# Patient Record
Sex: MALE | Race: BLACK OR AFRICAN AMERICAN | Employment: UNEMPLOYED | URBAN - METROPOLITAN AREA
[De-identification: names, ages, dates, MRNs, and addresses within clinical notes are randomized per-mention and may not be internally consistent; named-entity substitution may affect disease eponyms.]

---

## 2018-05-02 PROBLEM — R10.9 ABDOMINAL PAIN: Status: ACTIVE | Noted: 2018-05-02

## 2023-09-06 ENCOUNTER — APPOINTMENT (OUTPATIENT)
Dept: GENERAL RADIOLOGY | Age: 38
End: 2023-09-06

## 2023-09-06 ENCOUNTER — HOSPITAL ENCOUNTER (EMERGENCY)
Age: 38
Discharge: HOME | End: 2023-09-06
Attending: EMERGENCY MEDICINE

## 2023-09-06 VITALS
SYSTOLIC BLOOD PRESSURE: 137 MMHG | BODY MASS INDEX: 18.27 KG/M2 | HEIGHT: 74 IN | TEMPERATURE: 98.5 F | OXYGEN SATURATION: 100 % | WEIGHT: 142.4 LBS | RESPIRATION RATE: 18 BRPM | HEART RATE: 93 BPM | DIASTOLIC BLOOD PRESSURE: 98 MMHG

## 2023-09-06 DIAGNOSIS — F10.930 ALCOHOL WITHDRAWAL SYNDROME WITHOUT COMPLICATION (HCC): Primary | ICD-10-CM

## 2023-09-06 LAB
ALBUMIN SERPL-MCNC: 4.5 G/DL (ref 3.4–5)
ALBUMIN/GLOB SERPL: 1.1 {RATIO} (ref 1.1–2.2)
ALP SERPL-CCNC: 135 U/L (ref 40–129)
ALT SERPL-CCNC: 125 U/L (ref 10–40)
ANION GAP SERPL CALCULATED.3IONS-SCNC: 19 MMOL/L (ref 3–16)
AST SERPL-CCNC: 497 U/L (ref 15–37)
BASOPHILS # BLD: 0 K/UL (ref 0–0.2)
BASOPHILS NFR BLD: 0.6 %
BILIRUB SERPL-MCNC: 1.4 MG/DL (ref 0–1)
BUN SERPL-MCNC: 6 MG/DL (ref 7–20)
CALCIUM SERPL-MCNC: 8.7 MG/DL (ref 8.3–10.6)
CHLORIDE SERPL-SCNC: 95 MMOL/L (ref 99–110)
CO2 SERPL-SCNC: 23 MMOL/L (ref 21–32)
CREAT SERPL-MCNC: 0.5 MG/DL (ref 0.9–1.3)
DEPRECATED RDW RBC AUTO: 15.5 % (ref 12.4–15.4)
EKG ATRIAL RATE: 84 BPM
EKG DIAGNOSIS: NORMAL
EKG P AXIS: 52 DEGREES
EKG P-R INTERVAL: 146 MS
EKG Q-T INTERVAL: 438 MS
EKG QRS DURATION: 84 MS
EKG QTC CALCULATION (BAZETT): 517 MS
EKG R AXIS: -14 DEGREES
EKG T AXIS: 8 DEGREES
EKG VENTRICULAR RATE: 84 BPM
EOSINOPHIL # BLD: 0 K/UL (ref 0–0.6)
EOSINOPHIL NFR BLD: 0 %
GFR SERPLBLD CREATININE-BSD FMLA CKD-EPI: >60 ML/MIN/{1.73_M2}
GLUCOSE SERPL-MCNC: 74 MG/DL (ref 70–99)
HCT VFR BLD AUTO: 32.9 % (ref 40.5–52.5)
HGB BLD-MCNC: 11.4 G/DL (ref 13.5–17.5)
LACTATE BLDV-SCNC: 1.3 MMOL/L (ref 0.4–1.9)
LIPASE SERPL-CCNC: 50 U/L (ref 13–60)
LYMPHOCYTES # BLD: 1.4 K/UL (ref 1–5.1)
LYMPHOCYTES NFR BLD: 30 %
MCH RBC QN AUTO: 37.6 PG (ref 26–34)
MCHC RBC AUTO-ENTMCNC: 34.6 G/DL (ref 31–36)
MCV RBC AUTO: 108.6 FL (ref 80–100)
MONOCYTES # BLD: 0.2 K/UL (ref 0–1.3)
MONOCYTES NFR BLD: 5.3 %
NEUTROPHILS # BLD: 3 K/UL (ref 1.7–7.7)
NEUTROPHILS NFR BLD: 64.1 %
PLATELET # BLD AUTO: 53 K/UL (ref 135–450)
PMV BLD AUTO: 9.1 FL (ref 5–10.5)
POTASSIUM SERPL-SCNC: 3.6 MMOL/L (ref 3.5–5.1)
PROT SERPL-MCNC: 8.5 G/DL (ref 6.4–8.2)
RBC # BLD AUTO: 3.03 M/UL (ref 4.2–5.9)
SODIUM SERPL-SCNC: 137 MMOL/L (ref 136–145)
WBC # BLD AUTO: 4.6 K/UL (ref 4–11)

## 2023-09-06 PROCEDURE — 83605 ASSAY OF LACTIC ACID: CPT

## 2023-09-06 PROCEDURE — 80053 COMPREHEN METABOLIC PANEL: CPT

## 2023-09-06 PROCEDURE — 36415 COLL VENOUS BLD VENIPUNCTURE: CPT

## 2023-09-06 PROCEDURE — 2580000003 HC RX 258: Performed by: EMERGENCY MEDICINE

## 2023-09-06 PROCEDURE — 93005 ELECTROCARDIOGRAM TRACING: CPT | Performed by: EMERGENCY MEDICINE

## 2023-09-06 PROCEDURE — 85025 COMPLETE CBC W/AUTO DIFF WBC: CPT

## 2023-09-06 PROCEDURE — 6370000000 HC RX 637 (ALT 250 FOR IP): Performed by: EMERGENCY MEDICINE

## 2023-09-06 PROCEDURE — 83690 ASSAY OF LIPASE: CPT

## 2023-09-06 PROCEDURE — 71046 X-RAY EXAM CHEST 2 VIEWS: CPT

## 2023-09-06 PROCEDURE — 99285 EMERGENCY DEPT VISIT HI MDM: CPT

## 2023-09-06 RX ORDER — LORAZEPAM 2 MG/ML
2 CONCENTRATE ORAL
Status: DISCONTINUED | OUTPATIENT
Start: 2023-09-06 | End: 2023-09-06 | Stop reason: HOSPADM

## 2023-09-06 RX ORDER — LORAZEPAM 2 MG/ML
1 CONCENTRATE ORAL
Status: DISCONTINUED | OUTPATIENT
Start: 2023-09-06 | End: 2023-09-06 | Stop reason: HOSPADM

## 2023-09-06 RX ORDER — LORAZEPAM 1 MG/1
2 TABLET ORAL
Status: DISCONTINUED | OUTPATIENT
Start: 2023-09-06 | End: 2023-09-06 | Stop reason: HOSPADM

## 2023-09-06 RX ORDER — LORAZEPAM 1 MG/1
4 TABLET ORAL
Status: DISCONTINUED | OUTPATIENT
Start: 2023-09-06 | End: 2023-09-06 | Stop reason: HOSPADM

## 2023-09-06 RX ORDER — 0.9 % SODIUM CHLORIDE 0.9 %
1000 INTRAVENOUS SOLUTION INTRAVENOUS ONCE
Status: COMPLETED | OUTPATIENT
Start: 2023-09-06 | End: 2023-09-06

## 2023-09-06 RX ORDER — LORAZEPAM 1 MG/1
3 TABLET ORAL
Status: DISCONTINUED | OUTPATIENT
Start: 2023-09-06 | End: 2023-09-06 | Stop reason: HOSPADM

## 2023-09-06 RX ORDER — LORAZEPAM 1 MG/1
1 TABLET ORAL
Status: DISCONTINUED | OUTPATIENT
Start: 2023-09-06 | End: 2023-09-06 | Stop reason: HOSPADM

## 2023-09-06 RX ORDER — LORAZEPAM 2 MG/ML
3 CONCENTRATE ORAL
Status: DISCONTINUED | OUTPATIENT
Start: 2023-09-06 | End: 2023-09-06 | Stop reason: HOSPADM

## 2023-09-06 RX ORDER — LIDOCAINE 4 G/G
1 PATCH TOPICAL DAILY
Status: DISCONTINUED | OUTPATIENT
Start: 2023-09-06 | End: 2023-09-06 | Stop reason: HOSPADM

## 2023-09-06 RX ORDER — LORAZEPAM 2 MG/ML
4 CONCENTRATE ORAL
Status: DISCONTINUED | OUTPATIENT
Start: 2023-09-06 | End: 2023-09-06 | Stop reason: HOSPADM

## 2023-09-06 RX ADMIN — SODIUM CHLORIDE 1000 ML: 9 INJECTION, SOLUTION INTRAVENOUS at 12:45

## 2023-09-06 RX ADMIN — LORAZEPAM 1 MG: 1 TABLET ORAL at 13:18

## 2023-09-06 ASSESSMENT — PAIN DESCRIPTION - PAIN TYPE: TYPE: ACUTE PAIN

## 2023-09-06 ASSESSMENT — PAIN - FUNCTIONAL ASSESSMENT: PAIN_FUNCTIONAL_ASSESSMENT: 0-10

## 2023-09-06 ASSESSMENT — PAIN DESCRIPTION - ORIENTATION: ORIENTATION: LEFT

## 2023-09-06 ASSESSMENT — PAIN DESCRIPTION - LOCATION: LOCATION: RIB CAGE

## 2023-09-06 ASSESSMENT — PAIN DESCRIPTION - DESCRIPTORS: DESCRIPTORS: ACHING

## 2023-09-06 ASSESSMENT — PAIN SCALES - GENERAL: PAINLEVEL_OUTOF10: 9

## 2023-09-06 ASSESSMENT — PAIN DESCRIPTION - FREQUENCY: FREQUENCY: CONTINUOUS

## 2023-09-06 NOTE — CARE COORDINATION
3:53 PM  KAILA spoke to teresita kirkpatrick pt is able to go to [facility] if he can make it there in an hour. Teresita kirkpatrick is setting up rehab admission. KAILA spoke to RN she is DC pt now. KAILA scheduled lyft, they will be here to  pt in 5mins, RN is aware. Pt is aware he needs to call [phone] on they [address] to complete his intake for rehab. Electronically signed by WU Dubois LSW on 9/6/2023 at 3:53 PM    3:29 PM    Teresita is reaching out to pt via phone since she is unable to make it to the hospital at the moment. Teresita has been working on finding a rehab for pt to go to. Electronically signed by WU Dubois LSW on 9/6/2023 at 3:29 PM    12:50 PM    KAILA cons notes. Pt is interested in treatment. KAILA spoke to teresita kirkpatrick at [facility], she will come discuss detox and treatment options w/pt. SW following.   Electronically signed by WU Dubois LSW on 9/6/2023 at 12:50 PM  [phone]

## 2023-09-06 NOTE — DISCHARGE INSTRUCTIONS
Please go directly to the alcohol withdrawal treatment center. Please return to emergency department if you are unable to be treated and continue tohave withdrawal symptoms.

## 2023-09-06 NOTE — ED PROVIDER NOTES
[facility]          ATTENDING PHYSICIAN NOTE       Date of evaluation: 9/6/2023      Assessment/ Medical Decion Making     MDM:       ED Course as of 09/07/23 1029   Wed Sep 06, 2023   156 70-year-old male with history of alcohol use disorder with withdrawal who presents for evaluation of rib pain and symptoms of alcohol withdrawal.  Clinically in alcohol withdrawal on exam.  Will monitor CIWA's and treat symptomatically. Will check labs given his history of alcoholic hepatitis as well as his vomiting. Will obtain chest x-ray to evaluate for rib fracture or associated complication. Since he is normoxic on room air, do not feel that he needs CT unless chest x-ray is markedly abnormal.  Will be receiving benzodiazepines for alcohol withdrawal and lidocaine patch applied for symptoms. EKG obtained and interpreted by me with sinus rhythm, normal axis, prolonged QT, large T waves that do not appear ischemic. Isolated T wave inversion in lead III but otherwise no ischemia. No prior for comparison. [MM]   1358 Update: Labs are stable to improved from his baseline. X-ray shows rib fracture that is nondisplaced without evidence of pneumothorax or effusion. [MM]   1400 Patient medicated per CIWA. [MM]      ED Course User Index  [MM] Trinidad Tovar MD     Update: There are social work consultation patient was able to get a bed for inpatient treatment alcohol use disorder and alcohol withdrawal syndrome. He was stable for discharge to be transported directly to treatment facility. Counseled to return to the ED if he was unable to obtain treatment for any reason and continued to have withdrawal symptoms. Medical Decision Making  Amount and/or Complexity of Data Reviewed  Labs: ordered. Radiology: ordered. ECG/medicine tests: ordered. Risk  OTC drugs. Prescription drug management. Ghazal Worthy MD  10:29 AM    Clinical Impression     1.  Alcohol withdrawal

## 2024-03-11 ENCOUNTER — APPOINTMENT (OUTPATIENT)
Dept: CT IMAGING | Age: 39
End: 2024-03-11
Payer: MEDICAID

## 2024-03-11 ENCOUNTER — APPOINTMENT (OUTPATIENT)
Dept: GENERAL RADIOLOGY | Age: 39
End: 2024-03-11
Payer: MEDICAID

## 2024-03-11 ENCOUNTER — HOSPITAL ENCOUNTER (INPATIENT)
Age: 39
LOS: 4 days | Discharge: HOME | End: 2024-03-15
Attending: STUDENT IN AN ORGANIZED HEALTH CARE EDUCATION/TRAINING PROGRAM | Admitting: INTERNAL MEDICINE
Payer: MEDICAID

## 2024-03-11 DIAGNOSIS — F10.939 ALCOHOL WITHDRAWAL SYNDROME WITH COMPLICATION (HCC): Primary | ICD-10-CM

## 2024-03-11 DIAGNOSIS — E87.5 HYPERKALEMIA: ICD-10-CM

## 2024-03-11 DIAGNOSIS — E87.20 LACTIC ACIDOSIS: ICD-10-CM

## 2024-03-11 PROBLEM — F10.931 ALCOHOL WITHDRAWAL DELIRIUM (HCC): Status: ACTIVE | Noted: 2024-03-11

## 2024-03-11 LAB
ALBUMIN SERPL-MCNC: 5.3 G/DL (ref 3.4–5)
ALP SERPL-CCNC: 200 U/L (ref 40–129)
ALT SERPL-CCNC: 244 U/L (ref 10–40)
ANION GAP SERPL CALCULATED.3IONS-SCNC: 39 MMOL/L (ref 3–16)
ANISOCYTOSIS BLD QL SMEAR: ABNORMAL
AST SERPL-CCNC: 1134 U/L (ref 15–37)
BACTERIA URNS QL MICRO: ABNORMAL /HPF
BASE EXCESS BLDV CALC-SCNC: -14.9 MMOL/L (ref -2–3)
BASOPHILS # BLD: 0 K/UL (ref 0–0.2)
BASOPHILS NFR BLD: 0 %
BILIRUB DIRECT SERPL-MCNC: 0.9 MG/DL (ref 0–0.3)
BILIRUB INDIRECT SERPL-MCNC: 0.6 MG/DL (ref 0–1)
BILIRUB SERPL-MCNC: 1.5 MG/DL (ref 0–1)
BILIRUB UR QL STRIP.AUTO: NEGATIVE
BUN SERPL-MCNC: 8 MG/DL (ref 7–20)
CALCIUM SERPL-MCNC: 9.2 MG/DL (ref 8.3–10.6)
CHLORIDE SERPL-SCNC: 94 MMOL/L (ref 99–110)
CK SERPL-CCNC: 464 U/L (ref 39–308)
CLARITY UR: CLEAR
CO2 BLDV-SCNC: 14 MMOL/L
CO2 SERPL-SCNC: 8 MMOL/L (ref 21–32)
COHGB MFR BLDV: 1.5 % (ref 0–1.5)
COLOR UR: YELLOW
CREAT SERPL-MCNC: 0.9 MG/DL (ref 0.9–1.3)
DEPRECATED RDW RBC AUTO: 15.4 % (ref 12.4–15.4)
DO-HGB MFR BLDV: 50.1 %
EOSINOPHIL # BLD: 0 K/UL (ref 0–0.6)
EOSINOPHIL NFR BLD: 0 %
EPI CELLS #/AREA URNS HPF: ABNORMAL /HPF (ref 0–5)
ETHANOLAMINE SERPL-MCNC: 35 MG/DL (ref 0–0.08)
GFR SERPLBLD CREATININE-BSD FMLA CKD-EPI: >60 ML/MIN/{1.73_M2}
GLUCOSE BLD-MCNC: 118 MG/DL (ref 70–99)
GLUCOSE BLD-MCNC: 120 MG/DL (ref 70–99)
GLUCOSE BLD-MCNC: 149 MG/DL (ref 70–99)
GLUCOSE SERPL-MCNC: 97 MG/DL (ref 70–99)
GLUCOSE UR STRIP.AUTO-MCNC: NEGATIVE MG/DL
HCO3 BLDV-SCNC: 12.8 MMOL/L (ref 24–28)
HCT VFR BLD AUTO: 42.5 % (ref 40.5–52.5)
HGB BLD-MCNC: 14 G/DL (ref 13.5–17.5)
HGB UR QL STRIP.AUTO: ABNORMAL
HYALINE CASTS #/AREA URNS LPF: ABNORMAL /LPF (ref 0–2)
KETONES UR STRIP.AUTO-MCNC: >=80 MG/DL
LACTATE BLDV-SCNC: 5 MMOL/L (ref 0.4–1.9)
LACTATE BLDV-SCNC: 9.1 MMOL/L (ref 0.4–1.9)
LEUKOCYTE ESTERASE UR QL STRIP.AUTO: NEGATIVE
LIPASE SERPL-CCNC: 41 U/L (ref 13–60)
LIPASE SERPL-CCNC: 55 U/L (ref 13–60)
LYMPHOCYTES # BLD: 0.7 K/UL (ref 1–5.1)
LYMPHOCYTES NFR BLD: 8 %
MACROCYTES BLD QL SMEAR: ABNORMAL
MCH RBC QN AUTO: 38.2 PG (ref 26–34)
MCHC RBC AUTO-ENTMCNC: 32.8 G/DL (ref 31–36)
MCV RBC AUTO: 116.5 FL (ref 80–100)
METHGB MFR BLDV: 0.5 % (ref 0–1.5)
MONOCYTES # BLD: 0.3 K/UL (ref 0–1.3)
MONOCYTES NFR BLD: 3 %
NEUTROPHILS # BLD: 7.5 K/UL (ref 1.7–7.7)
NEUTROPHILS NFR BLD: 78 %
NEUTS BAND NFR BLD MANUAL: 11 % (ref 0–7)
NITRITE UR QL STRIP.AUTO: NEGATIVE
NRBC BLD-RTO: 4 /100 WBC
NT-PROBNP SERPL-MCNC: 107 PG/ML (ref 0–124)
PCO2 BLDV: 35.5 MMHG (ref 41–51)
PERFORMED ON: ABNORMAL
PH BLDV: 7.17 [PH] (ref 7.35–7.45)
PH UR STRIP.AUTO: 6 [PH] (ref 5–8)
PHOSPHATE SERPL-MCNC: 3.4 MG/DL (ref 2.5–4.9)
PLATELET # BLD AUTO: 55 K/UL (ref 135–450)
PLATELET BLD QL SMEAR: ABNORMAL
PMV BLD AUTO: 10.5 FL (ref 5–10.5)
PO2 BLDV: <30 MMHG (ref 25–40)
POTASSIUM SERPL-SCNC: 5.8 MMOL/L (ref 3.5–5.1)
PROT SERPL-MCNC: 10.7 G/DL (ref 6.4–8.2)
PROT UR STRIP.AUTO-MCNC: 100 MG/DL
RBC # BLD AUTO: 3.65 M/UL (ref 4.2–5.9)
RBC #/AREA URNS HPF: ABNORMAL /HPF (ref 0–4)
SAO2 % BLDV: 49 %
SODIUM SERPL-SCNC: 141 MMOL/L (ref 136–145)
SP GR UR STRIP.AUTO: >=1.03 (ref 1–1.03)
TROPONIN, HIGH SENSITIVITY: <6 NG/L (ref 0–22)
TROPONIN, HIGH SENSITIVITY: <6 NG/L (ref 0–22)
UA COMPLETE W REFLEX CULTURE PNL UR: ABNORMAL
UA DIPSTICK W REFLEX MICRO PNL UR: YES
URN SPEC COLLECT METH UR: ABNORMAL
UROBILINOGEN UR STRIP-ACNC: 0.2 E.U./DL
WBC # BLD AUTO: 8.4 K/UL (ref 4–11)
WBC #/AREA URNS HPF: ABNORMAL /HPF (ref 0–5)

## 2024-03-11 PROCEDURE — 71046 X-RAY EXAM CHEST 2 VIEWS: CPT

## 2024-03-11 PROCEDURE — C9113 INJ PANTOPRAZOLE SODIUM, VIA: HCPCS | Performed by: STUDENT IN AN ORGANIZED HEALTH CARE EDUCATION/TRAINING PROGRAM

## 2024-03-11 PROCEDURE — 83690 ASSAY OF LIPASE: CPT

## 2024-03-11 PROCEDURE — 96361 HYDRATE IV INFUSION ADD-ON: CPT

## 2024-03-11 PROCEDURE — 99285 EMERGENCY DEPT VISIT HI MDM: CPT

## 2024-03-11 PROCEDURE — 84100 ASSAY OF PHOSPHORUS: CPT

## 2024-03-11 PROCEDURE — 82803 BLOOD GASES ANY COMBINATION: CPT

## 2024-03-11 PROCEDURE — 73562 X-RAY EXAM OF KNEE 3: CPT

## 2024-03-11 PROCEDURE — 84484 ASSAY OF TROPONIN QUANT: CPT

## 2024-03-11 PROCEDURE — 2580000003 HC RX 258

## 2024-03-11 PROCEDURE — 2060000000 HC ICU INTERMEDIATE R&B

## 2024-03-11 PROCEDURE — 80076 HEPATIC FUNCTION PANEL: CPT

## 2024-03-11 PROCEDURE — 6360000002 HC RX W HCPCS: Performed by: STUDENT IN AN ORGANIZED HEALTH CARE EDUCATION/TRAINING PROGRAM

## 2024-03-11 PROCEDURE — 81001 URINALYSIS AUTO W/SCOPE: CPT

## 2024-03-11 PROCEDURE — 80048 BASIC METABOLIC PNL TOTAL CA: CPT

## 2024-03-11 PROCEDURE — 93005 ELECTROCARDIOGRAM TRACING: CPT

## 2024-03-11 PROCEDURE — 85025 COMPLETE CBC W/AUTO DIFF WBC: CPT

## 2024-03-11 PROCEDURE — 82077 ASSAY SPEC XCP UR&BREATH IA: CPT

## 2024-03-11 PROCEDURE — 2580000003 HC RX 258: Performed by: INTERNAL MEDICINE

## 2024-03-11 PROCEDURE — 2500000003 HC RX 250 WO HCPCS: Performed by: STUDENT IN AN ORGANIZED HEALTH CARE EDUCATION/TRAINING PROGRAM

## 2024-03-11 PROCEDURE — 36415 COLL VENOUS BLD VENIPUNCTURE: CPT

## 2024-03-11 PROCEDURE — 6360000002 HC RX W HCPCS

## 2024-03-11 PROCEDURE — 87040 BLOOD CULTURE FOR BACTERIA: CPT

## 2024-03-11 PROCEDURE — 6370000000 HC RX 637 (ALT 250 FOR IP): Performed by: STUDENT IN AN ORGANIZED HEALTH CARE EDUCATION/TRAINING PROGRAM

## 2024-03-11 PROCEDURE — 74174 CTA ABD&PLVS W/CONTRAST: CPT

## 2024-03-11 PROCEDURE — 83880 ASSAY OF NATRIURETIC PEPTIDE: CPT

## 2024-03-11 PROCEDURE — 2580000003 HC RX 258: Performed by: STUDENT IN AN ORGANIZED HEALTH CARE EDUCATION/TRAINING PROGRAM

## 2024-03-11 PROCEDURE — 82550 ASSAY OF CK (CPK): CPT

## 2024-03-11 PROCEDURE — 96366 THER/PROPH/DIAG IV INF ADDON: CPT

## 2024-03-11 PROCEDURE — 6360000004 HC RX CONTRAST MEDICATION

## 2024-03-11 PROCEDURE — 96365 THER/PROPH/DIAG IV INF INIT: CPT

## 2024-03-11 PROCEDURE — 83605 ASSAY OF LACTIC ACID: CPT

## 2024-03-11 PROCEDURE — 96375 TX/PRO/DX INJ NEW DRUG ADDON: CPT

## 2024-03-11 RX ORDER — PHENOBARBITAL SODIUM 65 MG/ML
65 INJECTION, SOLUTION INTRAMUSCULAR; INTRAVENOUS 2 TIMES DAILY
Status: COMPLETED | OUTPATIENT
Start: 2024-03-12 | End: 2024-03-13

## 2024-03-11 RX ORDER — THIAMINE HYDROCHLORIDE 100 MG/ML
100 INJECTION, SOLUTION INTRAMUSCULAR; INTRAVENOUS DAILY
Status: DISCONTINUED | OUTPATIENT
Start: 2024-03-11 | End: 2024-03-11

## 2024-03-11 RX ORDER — ONDANSETRON 2 MG/ML
4 INJECTION INTRAMUSCULAR; INTRAVENOUS ONCE
Status: COMPLETED | OUTPATIENT
Start: 2024-03-11 | End: 2024-03-11

## 2024-03-11 RX ORDER — SODIUM CHLORIDE 0.9 % (FLUSH) 0.9 %
5-40 SYRINGE (ML) INJECTION EVERY 12 HOURS SCHEDULED
Status: DISCONTINUED | OUTPATIENT
Start: 2024-03-11 | End: 2024-03-15 | Stop reason: HOSPADM

## 2024-03-11 RX ORDER — SODIUM CHLORIDE 0.9 % (FLUSH) 0.9 %
5-40 SYRINGE (ML) INJECTION PRN
Status: DISCONTINUED | OUTPATIENT
Start: 2024-03-11 | End: 2024-03-15 | Stop reason: HOSPADM

## 2024-03-11 RX ORDER — PHENOBARBITAL SODIUM 65 MG/ML
15 INJECTION, SOLUTION INTRAMUSCULAR; INTRAVENOUS ONCE
Status: DISCONTINUED | OUTPATIENT
Start: 2024-03-11 | End: 2024-03-11

## 2024-03-11 RX ORDER — SODIUM CHLORIDE 9 MG/ML
INJECTION, SOLUTION INTRAVENOUS PRN
Status: DISCONTINUED | OUTPATIENT
Start: 2024-03-11 | End: 2024-03-15 | Stop reason: HOSPADM

## 2024-03-11 RX ORDER — SODIUM CHLORIDE, SODIUM LACTATE, POTASSIUM CHLORIDE, AND CALCIUM CHLORIDE .6; .31; .03; .02 G/100ML; G/100ML; G/100ML; G/100ML
1000 INJECTION, SOLUTION INTRAVENOUS ONCE
Status: COMPLETED | OUTPATIENT
Start: 2024-03-11 | End: 2024-03-11

## 2024-03-11 RX ORDER — HYDROMORPHONE HYDROCHLORIDE 1 MG/ML
0.5 INJECTION, SOLUTION INTRAMUSCULAR; INTRAVENOUS; SUBCUTANEOUS
Status: COMPLETED | OUTPATIENT
Start: 2024-03-11 | End: 2024-03-11

## 2024-03-11 RX ORDER — CALCIUM GLUCONATE 94 MG/ML
1000 INJECTION, SOLUTION INTRAVENOUS ONCE
Status: COMPLETED | OUTPATIENT
Start: 2024-03-11 | End: 2024-03-11

## 2024-03-11 RX ORDER — PANTOPRAZOLE SODIUM 40 MG/10ML
40 INJECTION, POWDER, LYOPHILIZED, FOR SOLUTION INTRAVENOUS ONCE
Status: COMPLETED | OUTPATIENT
Start: 2024-03-11 | End: 2024-03-11

## 2024-03-11 RX ORDER — DEXTROSE MONOHYDRATE 100 MG/ML
INJECTION, SOLUTION INTRAVENOUS CONTINUOUS PRN
Status: DISCONTINUED | OUTPATIENT
Start: 2024-03-11 | End: 2024-03-15 | Stop reason: HOSPADM

## 2024-03-11 RX ORDER — DEXTROSE AND SODIUM CHLORIDE 5; .45 G/100ML; G/100ML
INJECTION, SOLUTION INTRAVENOUS CONTINUOUS
Status: DISCONTINUED | OUTPATIENT
Start: 2024-03-11 | End: 2024-03-12

## 2024-03-11 RX ORDER — DEXTROSE, SODIUM CHLORIDE, SODIUM LACTATE, POTASSIUM CHLORIDE, AND CALCIUM CHLORIDE 5; .6; .31; .03; .02 G/100ML; G/100ML; G/100ML; G/100ML; G/100ML
1000 INJECTION, SOLUTION INTRAVENOUS CONTINUOUS
Status: DISCONTINUED | OUTPATIENT
Start: 2024-03-11 | End: 2024-03-12

## 2024-03-11 RX ORDER — GLUCAGON 1 MG/ML
1 KIT INJECTION PRN
Status: DISCONTINUED | OUTPATIENT
Start: 2024-03-11 | End: 2024-03-15 | Stop reason: HOSPADM

## 2024-03-11 RX ORDER — PHENOBARBITAL SODIUM 130 MG/ML
130 INJECTION, SOLUTION INTRAMUSCULAR; INTRAVENOUS
Status: DISCONTINUED | OUTPATIENT
Start: 2024-03-11 | End: 2024-03-15 | Stop reason: HOSPADM

## 2024-03-11 RX ORDER — NICOTINE 21 MG/24HR
1 PATCH, TRANSDERMAL 24 HOURS TRANSDERMAL DAILY
Status: DISCONTINUED | OUTPATIENT
Start: 2024-03-12 | End: 2024-03-15 | Stop reason: HOSPADM

## 2024-03-11 RX ORDER — PHENOBARBITAL SODIUM 65 MG/ML
65 INJECTION, SOLUTION INTRAMUSCULAR; INTRAVENOUS
Status: DISCONTINUED | OUTPATIENT
Start: 2024-03-11 | End: 2024-03-11

## 2024-03-11 RX ADMIN — DEXTROSE MONOHYDRATE 250 ML: 100 INJECTION, SOLUTION INTRAVENOUS at 20:47

## 2024-03-11 RX ADMIN — SODIUM CHLORIDE, SODIUM LACTATE, POTASSIUM CHLORIDE, CALCIUM CHLORIDE AND DEXTROSE MONOHYDRATE 1000 ML: 5; 600; 310; 30; 20 INJECTION, SOLUTION INTRAVENOUS at 21:20

## 2024-03-11 RX ADMIN — SODIUM BICARBONATE 50 MEQ: 84 INJECTION, SOLUTION INTRAVENOUS at 20:25

## 2024-03-11 RX ADMIN — CALCIUM GLUCONATE 1000 MG: 98 INJECTION, SOLUTION INTRAVENOUS at 20:28

## 2024-03-11 RX ADMIN — THIAMINE HYDROCHLORIDE 100 MG: 100 INJECTION, SOLUTION INTRAMUSCULAR; INTRAVENOUS at 19:14

## 2024-03-11 RX ADMIN — HYDROMORPHONE HYDROCHLORIDE 0.5 MG: 1 INJECTION, SOLUTION INTRAMUSCULAR; INTRAVENOUS; SUBCUTANEOUS at 19:16

## 2024-03-11 RX ADMIN — IOPAMIDOL 75 ML: 755 INJECTION, SOLUTION INTRAVENOUS at 19:50

## 2024-03-11 RX ADMIN — PANTOPRAZOLE SODIUM 40 MG: 40 INJECTION, POWDER, FOR SOLUTION INTRAVENOUS at 20:37

## 2024-03-11 RX ADMIN — PHENOBARBITAL SODIUM 934.7 MG: 130 INJECTION INTRAMUSCULAR at 21:51

## 2024-03-11 RX ADMIN — INSULIN HUMAN 10 UNITS: 100 INJECTION, SOLUTION PARENTERAL at 21:16

## 2024-03-11 RX ADMIN — ONDANSETRON 4 MG: 2 INJECTION INTRAMUSCULAR; INTRAVENOUS at 19:18

## 2024-03-11 RX ADMIN — SODIUM CHLORIDE, POTASSIUM CHLORIDE, SODIUM LACTATE AND CALCIUM CHLORIDE 1000 ML: 600; 310; 30; 20 INJECTION, SOLUTION INTRAVENOUS at 19:17

## 2024-03-11 RX ADMIN — SODIUM CHLORIDE, PRESERVATIVE FREE 10 ML: 5 INJECTION INTRAVENOUS at 22:12

## 2024-03-11 RX ADMIN — DEXTROSE AND SODIUM CHLORIDE: 5; 450 INJECTION, SOLUTION INTRAVENOUS at 22:11

## 2024-03-11 ASSESSMENT — PAIN SCALES - GENERAL
PAINLEVEL_OUTOF10: 10
PAINLEVEL_OUTOF10: 10

## 2024-03-11 ASSESSMENT — LIFESTYLE VARIABLES
HOW MANY STANDARD DRINKS CONTAINING ALCOHOL DO YOU HAVE ON A TYPICAL DAY: 10 OR MORE
HOW OFTEN DO YOU HAVE A DRINK CONTAINING ALCOHOL: 4 OR MORE TIMES A WEEK
HOW OFTEN DO YOU HAVE A DRINK CONTAINING ALCOHOL: 4 OR MORE TIMES A WEEK
HOW MANY STANDARD DRINKS CONTAINING ALCOHOL DO YOU HAVE ON A TYPICAL DAY: 10 OR MORE

## 2024-03-11 ASSESSMENT — PAIN DESCRIPTION - DESCRIPTORS: DESCRIPTORS: ACHING

## 2024-03-11 ASSESSMENT — PAIN - FUNCTIONAL ASSESSMENT: PAIN_FUNCTIONAL_ASSESSMENT: 0-10

## 2024-03-11 ASSESSMENT — PAIN DESCRIPTION - LOCATION: LOCATION: ABDOMEN

## 2024-03-11 ASSESSMENT — PAIN DESCRIPTION - PAIN TYPE: TYPE: ACUTE PAIN

## 2024-03-11 ASSESSMENT — PAIN DESCRIPTION - FREQUENCY: FREQUENCY: CONTINUOUS

## 2024-03-11 NOTE — ED PROVIDER NOTES
[facility]  EMERGENCY DEPARTMENT ENCOUNTER          EM RESIDENT NOTE     Date of evaluation: 3/11/2024    ADDENDUM:      Care of this patient was assumed from Dr. Candelario.  The patient was seen for Abdominal Pain and Flank Pain (Left flank pain and abdominal pain and tenderness started last night)  .  The patient's initial evaluation and plan have been discussed with the prior provider who initially evaluated the patient.  Nursing Notes, Past Medical Hx, Past Surgical Hx, Social Hx, Allergies, and Family Hx were all reviewed.    MEDICAL DECISION MAKING / ASSESSMENT / PLAN     Fred Gonzales is a 38 y.o. male who presents for evaluation of abdominal pain and flank pain.  Patient has a history of alcohol use disorder with alcoholic hepatitis and withdrawal seizures as well as a history of hypertension and previous MI.  Reportedly has had 1 day of abdominal tenderness with significant guarding.  Endorses 3 days of poor p.o. intake although does endorse normal stooling.  Has also had some chest pain and shortness of breath that he states is exertional.  Has also had several falls and has pain over his left knee.  No fevers or chills.    He drinks 1 L of alcohol per day and his last drink was sometime yesterday.  He does have a history of previous alcohol withdrawal seizures but is unable to tell me the last time he had this.    At this time, follow-up.  - 3 view knee XR  - Cadiac work-up  - abdominal work-op  - CT abdomen pelvis.     On reevaluation, patient was tachycardic and appeared very uncomfortable.  He was extremely tremulous as well as very tender in his abdomen.  Given his tachycardia, hypertension, tremulousness and other symptoms, I do have high concern for alcohol withdrawal, particularly given his endorsement of significant alcohol intake previous history of alcohol withdrawal.    His workup here demonstrated moderate blood in the urine with no signs of microscopic hematuria, increasing to concern  for potential rhabdo.  CK was elevated to 464.  He has tenderness all over his body but does not have any focal firmness to increase my concern for potential compartment syndrome.  His LFTs are significantly deranged with a AST of 1134, ALT of 244 and elevated alk phos to 200.  T. bili is also elevated to 1.5 with a D bili elevation to 0.9.  His lactate was very elevated to 9.1 which is likely secondary to very poor p.o. intake and significantly deranged LFTs causing difficulty clearing his lactate.  He has no elevated leukocytosis or fever to increase my concern for potential infection so I did add on blood cultures but I am not going to treat him empirically with antibiotics.    His renal panel otherwise demonstrated hyperkalemia to 5.8.  His EKG demonstrated peaked T waves so I did treat him with calcium as well as insulin and dextrose.  His bicarb on his BMP was 8 so I will also treat him with sodium bicarb.  His anion gap is 39, likely secondary to an lactic acidosis.    Patient has received thiamine as well as 1 L of fluids.  He had previously received Dilaudid and Zofran prior to my coming on shift.  He has had frequent episodes of emesis with some darkly colored emesis so I am treating him with Protonix as well.  Regarding his alcohol withdrawal, I am going to treat him with phenobarbital, initially with a 15 mg/kg dosage.     Patient's CT scan demonstrated no signs of aortic dissection or other acute intra-abdominal abnormalities.  It did demonstrate hepatic steatosis.    Given the patient's alcohol withdrawal and current lab abnormalities, he is appropriate to be admitted to the ICU.      I have discussed this patient's case with the admitting service who will now assume care.. The patient will be monitored in the emergency department until which time they are moved to their final treatment location.    Is this patient to be included in the SEP-1 core measure? No Exclusion criteria - the patient is NOT to

## 2024-03-11 NOTE — ED PROVIDER NOTES
ED Attending Attestation Note     Date of evaluation: 3/11/2024    This patient was seen by the resident.  I have seen and examined the patient, agree with the workup, evaluation, management and diagnosis. The care plan has been discussed.  I have reviewed the ECG and concur with the resident's interpretation.  Briefly, this is a gentleman with past medical history of alcohol use disorder and previous alcohol withdrawal seizures who presents to the emergency department with nausea, vomiting and concern for flank and chest pain.  Incidentally, he has not had a drink in almost 24 hours and usually drinks at least a pint of vodka per day.  On my initial evaluation, patient is noted to be uncomfortable, tachycardic and mildly hypertensive.  After addressing some of his pain, it is noted that he is becoming increasingly tremulous and agitated and there is concern for acute alcohol withdrawal.  Patient initiated on phenobarbital loading with phenobarbital PRNs ordered in order to help facilitate management of his acute alcohol withdrawal.  Additionally, it is noted that he has evidence of profound metabolic acidosis most likely from a combination of alcoholic and starvation ketosis.  He additionally has evidence of elevated CK concerning for slight rhabdo as well as acute alcoholic hepatitis.  Patient given a significant volume IV fluids and will be admitted to the ICU.    CRITICAL CARE TIME    I have seen and examined this patient and provided 55 minutes of critical care time exclusive of separately billed procedures and treating other patients.  Critical care was necessary to treat or prevent imminent or life threatening deterioration of the following condition(s): Acute alcohol withdrawal, vital sign instability, metabolic acidosis, alcoholic hepatitis, lactic acidosis, starvation ketosis    Critical care time was spent personally by me on the following activities: Discussion with primary provider and consultants,  chart review, ordering and interpretation of laboratory and other diagnostic testing, reassessment after intervention    MD Willi Chávez Olivia, MD  03/13/24 3387     HLD (hyperlipidemia)

## 2024-03-11 NOTE — ED PROVIDER NOTES
provider           Medical Decision Making  Amount and/or Complexity of Data Reviewed  Labs: ordered.  Radiology: ordered.  ECG/medicine tests: ordered.    Risk  Prescription drug management.        The patient was given the following medications:  Orders Placed This Encounter   Medications    lactated ringers bolus 1,000 mL    HYDROmorphone HCl PF (DILAUDID) injection 0.5 mg    ondansetron (ZOFRAN) injection 4 mg    thiamine (B-1) injection 100 mg       CONSULTS:  None    Clinical Impression   No diagnosis found.  Disposition   PATIENT REFERRED TO:  No follow-up provider specified.  DISCHARGE MEDICATIONS:  New Prescriptions    No medications on file     DISPOSITION      Diagnostic Results and Other Data   RADIOLOGY:  XR CHEST (2 VW)   Final Result      No acute pulmonary disease.         XR KNEE LEFT (3 VIEWS)   Final Result   No acute osseous injury.         CTA CHEST ABDOMEN PELVIS W CONTRAST    (Results Pending)     LABS:   Results for orders placed or performed during the hospital encounter of 03/11/24   Urinalysis with Reflex to Culture    Specimen: Urine   Result Value Ref Range    Color, UA Yellow Straw/Yellow    Clarity, UA Clear Clear    Glucose, Ur Negative Negative mg/dL    Bilirubin Urine Negative Negative    Ketones, Urine >=80 (A) Negative mg/dL    Specific Gravity, UA >=1.030 1.005 - 1.030    Blood, Urine MODERATE (A) Negative    pH, UA 6.0 5.0 - 8.0    Protein,  (A) Negative mg/dL    Urobilinogen, Urine 0.2 <2.0 E.U./dL    Nitrite, Urine Negative Negative    Leukocyte Esterase, Urine Negative Negative    Microscopic Examination YES     Urine Type NotGiven     Urine Reflex to Culture Not Indicated    Microscopic Urinalysis   Result Value Ref Range    Hyaline Casts, UA 3-5 (A) 0 - 2 /LPF    WBC, UA None seen 0 - 5 /HPF    RBC, UA 0-2 0 - 4 /HPF    Epithelial Cells, UA 0-1 0 - 5 /HPF    Bacteria, UA 1+ (A) None Seen /HPF     EKG   See ED course above for interpretation  Interpreted in conjunction  test.  Extremities: Extremities warm and perfused. 2+ radial & DP pulses b/l. <2 sec capillary refill. No peripheral edema.  Skin:  Warm. Dry. No apparent rashes.  Neuro:  Alert. Moves all four extremities to command. No gross focal deficit.    -------------------------------  This note was generated in part utilizing Dragon dictation speech recognition software.  Occasionally, words are mistranscribed and despite editing, the text may contain inaccuracies due to incorrect word recognition       Ti Candelario MD  Resident  03/11/24 1925

## 2024-03-12 LAB
ALBUMIN SERPL-MCNC: 4.6 G/DL (ref 3.4–5)
ALBUMIN/GLOB SERPL: 1 {RATIO} (ref 1.1–2.2)
ALP SERPL-CCNC: 155 U/L (ref 40–129)
ALT SERPL-CCNC: 172 U/L (ref 10–40)
ANION GAP SERPL CALCULATED.3IONS-SCNC: 25 MMOL/L (ref 3–16)
ANION GAP SERPL CALCULATED.3IONS-SCNC: 31 MMOL/L (ref 3–16)
AST SERPL-CCNC: 553 U/L (ref 15–37)
BILIRUB SERPL-MCNC: 1.4 MG/DL (ref 0–1)
BUN SERPL-MCNC: 7 MG/DL (ref 7–20)
BUN SERPL-MCNC: 7 MG/DL (ref 7–20)
CALCIUM SERPL-MCNC: 9.3 MG/DL (ref 8.3–10.6)
CALCIUM SERPL-MCNC: 9.7 MG/DL (ref 8.3–10.6)
CHLORIDE SERPL-SCNC: 93 MMOL/L (ref 99–110)
CHLORIDE SERPL-SCNC: 96 MMOL/L (ref 99–110)
CO2 SERPL-SCNC: 12 MMOL/L (ref 21–32)
CO2 SERPL-SCNC: 14 MMOL/L (ref 21–32)
CREAT SERPL-MCNC: 0.8 MG/DL (ref 0.9–1.3)
CREAT SERPL-MCNC: 0.9 MG/DL (ref 0.9–1.3)
EKG ATRIAL RATE: 119 BPM
EKG DIAGNOSIS: NORMAL
EKG P AXIS: 51 DEGREES
EKG P-R INTERVAL: 130 MS
EKG Q-T INTERVAL: 378 MS
EKG QRS DURATION: 82 MS
EKG QTC CALCULATION (BAZETT): 531 MS
EKG R AXIS: -37 DEGREES
EKG T AXIS: 44 DEGREES
EKG VENTRICULAR RATE: 119 BPM
GFR SERPLBLD CREATININE-BSD FMLA CKD-EPI: >60 ML/MIN/{1.73_M2}
GFR SERPLBLD CREATININE-BSD FMLA CKD-EPI: >60 ML/MIN/{1.73_M2}
GLUCOSE BLD-MCNC: 137 MG/DL (ref 70–99)
GLUCOSE BLD-MCNC: 139 MG/DL (ref 70–99)
GLUCOSE BLD-MCNC: 151 MG/DL (ref 70–99)
GLUCOSE BLD-MCNC: 185 MG/DL (ref 70–99)
GLUCOSE SERPL-MCNC: 127 MG/DL (ref 70–99)
GLUCOSE SERPL-MCNC: 137 MG/DL (ref 70–99)
MAGNESIUM SERPL-MCNC: 1.6 MG/DL (ref 1.8–2.4)
PERFORMED ON: ABNORMAL
POTASSIUM SERPL-SCNC: 4.6 MMOL/L (ref 3.5–5.1)
POTASSIUM SERPL-SCNC: 5.1 MMOL/L (ref 3.5–5.1)
PROT SERPL-MCNC: 9.2 G/DL (ref 6.4–8.2)
SODIUM SERPL-SCNC: 132 MMOL/L (ref 136–145)
SODIUM SERPL-SCNC: 139 MMOL/L (ref 136–145)

## 2024-03-12 PROCEDURE — HZ2ZZZZ DETOXIFICATION SERVICES FOR SUBSTANCE ABUSE TREATMENT: ICD-10-PCS | Performed by: STUDENT IN AN ORGANIZED HEALTH CARE EDUCATION/TRAINING PROGRAM

## 2024-03-12 PROCEDURE — 6360000002 HC RX W HCPCS: Performed by: STUDENT IN AN ORGANIZED HEALTH CARE EDUCATION/TRAINING PROGRAM

## 2024-03-12 PROCEDURE — A4216 STERILE WATER/SALINE, 10 ML: HCPCS | Performed by: INTERNAL MEDICINE

## 2024-03-12 PROCEDURE — 36415 COLL VENOUS BLD VENIPUNCTURE: CPT

## 2024-03-12 PROCEDURE — C9113 INJ PANTOPRAZOLE SODIUM, VIA: HCPCS | Performed by: INTERNAL MEDICINE

## 2024-03-12 PROCEDURE — 83735 ASSAY OF MAGNESIUM: CPT

## 2024-03-12 PROCEDURE — 6360000002 HC RX W HCPCS: Performed by: INTERNAL MEDICINE

## 2024-03-12 PROCEDURE — 2500000003 HC RX 250 WO HCPCS: Performed by: INTERNAL MEDICINE

## 2024-03-12 PROCEDURE — 6370000000 HC RX 637 (ALT 250 FOR IP): Performed by: INTERNAL MEDICINE

## 2024-03-12 PROCEDURE — 2580000003 HC RX 258: Performed by: STUDENT IN AN ORGANIZED HEALTH CARE EDUCATION/TRAINING PROGRAM

## 2024-03-12 PROCEDURE — 2580000003 HC RX 258: Performed by: INTERNAL MEDICINE

## 2024-03-12 PROCEDURE — 2060000000 HC ICU INTERMEDIATE R&B

## 2024-03-12 PROCEDURE — 80053 COMPREHEN METABOLIC PANEL: CPT

## 2024-03-12 RX ORDER — SODIUM CHLORIDE, SODIUM LACTATE, POTASSIUM CHLORIDE, CALCIUM CHLORIDE 600; 310; 30; 20 MG/100ML; MG/100ML; MG/100ML; MG/100ML
INJECTION, SOLUTION INTRAVENOUS CONTINUOUS
Status: DISCONTINUED | OUTPATIENT
Start: 2024-03-12 | End: 2024-03-14

## 2024-03-12 RX ORDER — LORAZEPAM 2 MG/ML
1 INJECTION INTRAMUSCULAR
Status: DISCONTINUED | OUTPATIENT
Start: 2024-03-12 | End: 2024-03-15 | Stop reason: HOSPADM

## 2024-03-12 RX ORDER — LORAZEPAM 1 MG/1
1 TABLET ORAL
Status: DISCONTINUED | OUTPATIENT
Start: 2024-03-12 | End: 2024-03-15 | Stop reason: HOSPADM

## 2024-03-12 RX ORDER — LORAZEPAM 2 MG/ML
2 INJECTION INTRAMUSCULAR
Status: DISCONTINUED | OUTPATIENT
Start: 2024-03-12 | End: 2024-03-15 | Stop reason: HOSPADM

## 2024-03-12 RX ORDER — LORAZEPAM 2 MG/ML
4 INJECTION INTRAMUSCULAR
Status: DISCONTINUED | OUTPATIENT
Start: 2024-03-12 | End: 2024-03-15 | Stop reason: HOSPADM

## 2024-03-12 RX ORDER — PROCHLORPERAZINE EDISYLATE 5 MG/ML
5 INJECTION INTRAMUSCULAR; INTRAVENOUS ONCE
Status: COMPLETED | OUTPATIENT
Start: 2024-03-12 | End: 2024-03-12

## 2024-03-12 RX ORDER — LORAZEPAM 1 MG/1
2 TABLET ORAL
Status: DISCONTINUED | OUTPATIENT
Start: 2024-03-12 | End: 2024-03-15 | Stop reason: HOSPADM

## 2024-03-12 RX ORDER — LORAZEPAM 1 MG/1
4 TABLET ORAL
Status: DISCONTINUED | OUTPATIENT
Start: 2024-03-12 | End: 2024-03-15 | Stop reason: HOSPADM

## 2024-03-12 RX ORDER — ACETAMINOPHEN 325 MG/1
650 TABLET ORAL EVERY 8 HOURS PRN
Status: DISCONTINUED | OUTPATIENT
Start: 2024-03-12 | End: 2024-03-15 | Stop reason: HOSPADM

## 2024-03-12 RX ORDER — LORAZEPAM 2 MG/ML
3 INJECTION INTRAMUSCULAR
Status: DISCONTINUED | OUTPATIENT
Start: 2024-03-12 | End: 2024-03-15 | Stop reason: HOSPADM

## 2024-03-12 RX ORDER — LORAZEPAM 1 MG/1
3 TABLET ORAL
Status: DISCONTINUED | OUTPATIENT
Start: 2024-03-12 | End: 2024-03-15 | Stop reason: HOSPADM

## 2024-03-12 RX ORDER — MAGNESIUM SULFATE IN WATER 40 MG/ML
2000 INJECTION, SOLUTION INTRAVENOUS ONCE
Status: COMPLETED | OUTPATIENT
Start: 2024-03-12 | End: 2024-03-12

## 2024-03-12 RX ADMIN — SODIUM CHLORIDE, PRESERVATIVE FREE 40 MG: 5 INJECTION INTRAVENOUS at 08:31

## 2024-03-12 RX ADMIN — PHENOBARBITAL SODIUM 65 MG: 65 INJECTION INTRAMUSCULAR; INTRAVENOUS at 21:03

## 2024-03-12 RX ADMIN — MAGNESIUM SULFATE HEPTAHYDRATE 2000 MG: 40 INJECTION, SOLUTION INTRAVENOUS at 17:13

## 2024-03-12 RX ADMIN — SODIUM CHLORIDE, POTASSIUM CHLORIDE, SODIUM LACTATE AND CALCIUM CHLORIDE: 600; 310; 30; 20 INJECTION, SOLUTION INTRAVENOUS at 17:11

## 2024-03-12 RX ADMIN — FOLIC ACID: 5 INJECTION, SOLUTION INTRAMUSCULAR; INTRAVENOUS; SUBCUTANEOUS at 08:33

## 2024-03-12 RX ADMIN — SODIUM CHLORIDE, PRESERVATIVE FREE 10 ML: 5 INJECTION INTRAVENOUS at 08:31

## 2024-03-12 RX ADMIN — SODIUM CHLORIDE, PRESERVATIVE FREE 10 ML: 5 INJECTION INTRAVENOUS at 21:10

## 2024-03-12 RX ADMIN — PHENOBARBITAL SODIUM 65 MG: 65 INJECTION INTRAMUSCULAR; INTRAVENOUS at 12:01

## 2024-03-12 RX ADMIN — LORAZEPAM 1 MG: 2 INJECTION INTRAMUSCULAR; INTRAVENOUS at 21:21

## 2024-03-12 RX ADMIN — SODIUM CHLORIDE, PRESERVATIVE FREE 40 MG: 5 INJECTION INTRAVENOUS at 21:03

## 2024-03-12 RX ADMIN — PROCHLORPERAZINE EDISYLATE 5 MG: 5 INJECTION INTRAMUSCULAR; INTRAVENOUS at 14:05

## 2024-03-12 ASSESSMENT — PAIN SCALES - GENERAL
PAINLEVEL_OUTOF10: 0
PAINLEVEL_OUTOF10: 0

## 2024-03-12 NOTE — PROGRESS NOTES
4 Eyes Skin Assessment     NAME:  Fred Gonzales  YOB: 1985  MEDICAL RECORD NUMBER:  [id number]    The patient is being assessed for  Admission    I agree that at least one RN has performed a thorough Head to Toe Skin Assessment on the patient. ALL assessment sites listed below have been assessed.      Areas assessed by both nurses:    Head, Face, Ears, Shoulders, Back, Chest, Arms, Elbows, Hands, Sacrum. Buttock, Coccyx, Ischium, Legs. Feet and Heels, and Under Medical Devices         Does the Patient have a Wound? No noted wound(s)       Luis Fernando Prevention initiated by RN: Yes  Wound Care Orders initiated by RN: No    Pressure Injury (Stage 3,4, Unstageable, DTI, NWPT, and Complex wounds) if present, place Wound referral order by RN under : No    New Ostomies, if present place, Ostomy referral order under : No     Nurse 1 eSignature: Electronically signed by Nadia Magallanes RN on 3/12/24 at 12:30 AM EDT    **SHARE this note so that the co-signing nurse can place an eSignature**    Nurse 2 eSignature: Electronically signed by Celsa Villanueva RN on 3/12/24 at 12:43 AM EDT

## 2024-03-12 NOTE — PROGRESS NOTES
Hospital Medicine Progress Note      Date of Admission: 3/11/2024  Hospital Day: 2    Chief Admission Complaint: Abdominal pain     Subjective: Patient was seen and evaluated at bedside.  Patient is drowsy but arousable.  Earlier this morning patient requested a diet.  Was started on a clear liquid diet after which he developed nausea.    Presenting Admission History:       28-year-old -American gentleman with a past medical history of alcohol abuse, alcohol withdrawal seizure and nicotine dependence.  He presented to the hospital for evaluation of abdominal pain ongoing for about 1 day prior to presentation.  Also reported poor oral intake for about 3 days.  In the ER patient was tachycardic up to the 130s.  Was otherwise hemodynamically stable.  Labs showed hyperkalemia with a potassium of 5.8, anion gap acidosis of 39.  Transaminitis with hyperbilirubinemia.  Macrocytosis. CTA of the abdomen pelvis was negative for dissection, aneurysm or intramural hematoma.  Showed fatty liver.  Patient was admitted for alcohol withdrawal.    Assessment/Plan:      Current Principal Problem:  Alcohol withdrawal delirium (HCC)    Alcohol abuse/alcohol withdrawal/nicotine dependence/encephalopathy  Transaminitis/fatty liver  Anion gap metabolic acidosis  Electrolyte imbalance/prolonged QTc    Plan:  *Reports consuming 1 L of alcohol per day, sometimes more depending on how much money he has  Imaging concerning for fatty liver  Started on phenobarbital therapy on admission  [facility] protocol  Continue thiamine supplementation  Continues to be encephalopathic with drowsy but arousable  Has thrombocytopenia likely secondary to liver disease  Says he wants to quit drinking  Social work consult, considering rehab    *Transaminitis improving  Check ultrasound of the abdomen for obstruction due to hyperbilirubinemia and abdominal pain  Lipase 55  On clear liquid diet, reports nausea  Avoid QT prolonging drugs due to prolonged  flush  5-40 mL IntraVENous 2 times per day    PHENobarbital  65 mg IntraVENous BID    nicotine  1 patch TransDERmal Daily    sodium chloride 0.9 % 1,000 mL with folic acid 1 mg, adult multi-vitamin with vitamin k 10 mL, thiamine 100 mg   IntraVENous Daily    pantoprazole (PROTONIX) 40 mg in sodium chloride (PF) 0.9 % 10 mL injection  40 mg IntraVENous Q12H     PRN Meds: LORazepam **OR** LORazepam **OR** LORazepam **OR** LORazepam **OR** LORazepam **OR** LORazepam **OR** LORazepam **OR** LORazepam, acetaminophen, glucose, dextrose bolus **OR** dextrose bolus, glucagon (rDNA), dextrose, sodium chloride flush, sodium chloride, PHENobarbital     Labs:  Personally reviewed and interpreted for clinical significance.     Recent Labs     03/11/24 1850   WBC 8.4   HGB 14.0   HCT 42.5   PLT 55*     Recent Labs     03/11/24  1850 03/11/24  2100 03/12/24  0058 03/12/24  0456     --  139 132*   K 5.8*  --  5.1 4.6   CL 94*  --  96* 93*   CO2 8*  --  12* 14*   BUN 8  --  7 7   CREATININE 0.9  --  0.8* 0.9   CALCIUM 9.2  --  9.3 9.7   MG  --   --   --  1.60*   PHOS  --  3.4  --   --      Recent Labs     03/11/24  1850 03/11/24  2100   PROBNP 107  --    TROPHS <6 <6     No results for input(s): \"LABA1C\" in the last 72 hours.  Recent Labs     03/11/24 1850 03/12/24  0456   AST 1,134* 553*   * 172*   BILIDIR 0.9*  --    BILITOT 1.5* 1.4*   ALKPHOS 200* 155*     No results for input(s): \"INR\", \"LACTA\", \"TSH\" in the last 72 hours.    Urine Cultures:   Lab Results   Component Value Date/Time    LABURIN No growth at 18-36 hours 07/22/2017 03:35 PM     Blood Cultures: No results found for: \"BC\"  No results found for: \"BLOODCULT2\"  Organism: No results found for: \"ORG\"      Jose D Braden MD

## 2024-03-12 NOTE — PLAN OF CARE
Problem: Discharge Planning  Goal: Discharge to home or other facility with appropriate resources  3/12/2024 1135 by Amber Pedraza, RN  Outcome: Progressing  Flowsheets (Taken 3/12/2024 1135)  Discharge to home or other facility with appropriate resources:   Identify discharge learning needs (meds, wound care, etc)   Identify barriers to discharge with patient and caregiver   Arrange for interpreters to assist at discharge as needed   Arrange for needed discharge resources and transportation as appropriate   Refer to discharge planning if patient needs post-hospital services based on physician order or complex needs related to functional status, cognitive ability or social support system     Problem: Pain  Goal: Verbalizes/displays adequate comfort level or baseline comfort level  3/12/2024 1135 by Amber Pedraza, RN  Outcome: Progressing  Flowsheets  Taken 3/12/2024 1135  Verbalizes/displays adequate comfort level or baseline comfort level:   Consider cultural and social influences on pain and pain management   Notify Licensed Independent Practitioner if interventions unsuccessful or patient reports new pain   Implement non-pharmacological measures as appropriate and evaluate response   Assess pain using appropriate pain scale   Administer analgesics based on type and severity of pain and evaluate response   Encourage patient to monitor pain and request assistance  Taken 3/12/2024 0835  Verbalizes/displays adequate comfort level or baseline comfort level: Encourage patient to monitor pain and request assistance     Problem: Safety - Adult  Goal: Free from fall injury  3/12/2024 1135 by Amber Pedraza, RN  Outcome: Progressing  Flowsheets (Taken 3/12/2024 1135)  Free From Fall Injury:   Based on caregiver fall risk screen, instruct family/caregiver to ask for assistance with transferring infant if caregiver noted to have fall risk factors   Instruct family/caregiver on patient safety

## 2024-03-12 NOTE — ED NOTES
2234   BP: (!) 162/107 120/80 120/80 (!) 131/108   Pulse: (!) 112 (!) 129 (!) 130 (!) 129   Resp: 19 21 17   Temp:       TempSrc:       SpO2: 100% 100%     Weight:       Height:         FiO2 (%):   O2 Flow Rate: O2 Device: None (Room air)    Cardiac Rhythm:    Pain Assessment:  [] Verbal [] Hall Baker Scale  Pain Scale: Pain Assessment  Pain Assessment: 0-10  Pain Level: 10  Patient's Stated Pain Goal: 0 - No pain  Pain Location: Abdomen  Pain Descriptors: Aching  Pain Type: Acute pain  Pain Frequency: Continuous  Last documented pain score (0-10 scale) Pain Level: 10  Last documented pain medication administered: See MAR  Mental Status: oriented and alert  Orientation Level:    NIH Score:    C-SSRS: Risk of Suicide: No Risk  Bedside swallow:    Scotrun Coma Scale (GCS): Paco Coma Scale  Eye Opening: Spontaneous  Best Verbal Response: Oriented  Best Motor Response: Obeys commands  Scotrun Coma Scale Score: 15  Active LDA's:   Peripheral IV 03/11/24 Proximal;Right Forearm (Active)   Line Status Blood return noted;Specimen collected;Flushed;Normal saline locked 03/11/24 1846   Phlebitis Assessment No symptoms 03/11/24 1846   Infiltration Assessment 0 03/11/24 1846   Dressing Status Clean, dry & intact 03/11/24 1846   Dressing Type Transparent 03/11/24 1846   Dressing Intervention New 03/11/24 1846       Peripheral IV 03/11/24 Distal;Left Forearm (Active)     PO Status: Regular  Pertinent or High Risk Medications/Drips: no   If Yes, please provide details:   Pending Blood Product Administration: no       You may also review the ED PT Care Timeline found under the Summary Nursing Index tab.    Recommendation    Pending orders ED orders complete  Plan for Discharge (if known):   Additional Comments:    If any further questions, please call Sending RN at [phone]    Electronically signed by: Electronically signed by Keisha Reynolds RN on 3/11/2024 at 11:00 PM       Keisha Reynolds RN  03/11/24 2792

## 2024-03-12 NOTE — H&P
V2.0  History and Physical      Name:  Fred Gonzales /Age/Sex: 1985  (38 y.o. male)   MRN & CSN:  [id number] & [id number] Encounter Date/Time: 3/11/2024 9:28 PM EDT   Location:  [place] [address] PCP: No primary care provider on file.       Hospital Day: 1    Assessment and Plan:   Fred Gonzales is a 38 y.o. male with a pmh of alcohol dependence who presents with nausea vomiting and alcohol withdrawal    Severe alcohol withdrawal with prior history of withdrawal seizures  Transaminitis  Severe alcoholic ketoacidosis  Malnutrition  Nicotine dependence  Anion gap metabolic acidosis secondary to alcoholic ketoacidosis  Lactic acidosis  Gastritis-upper GI bleed    Plan:  Admit to ICU  Initiate alcohol withdrawal protocol with phenobarb  IV fluids with D5 half NS at 100 cc an hour and also a banana bag at 100 cc an hour to total 200 cc an hour fluids  Patient received thiamine in the ED.  Hyperkalemia was treated with dextrose insulin and calcium will repeat potassium level  Repeat chemistry in 4 hours  Lovenox for DVT prophylaxis  Repeat a CMP in a.m.  Protonix twice daily.  Keep n.p.o. get Accu-Cheks every 6  Check lipase level    Disposition:   Current Living situation: Lives by himself in the community  Expected Disposition: 4 days  Estimated D/C: 4 days    Diet No diet orders on file   DVT Prophylaxis [x] Lovenox, []  Heparin, [] SCDs, [] Ambulation,  [] Eliquis, [] Xarelto, [] Coumadin   Code Status Prior   Surrogate Decision Maker/ POA  Adin Winters     Personally reviewed Lab Studies and Imaging         History from:     patient    History of Present Illness:     Chief Complaint: Abdominal pain nausea vomiting  Fred Gonzales is a 38 y.o. male with pmh of alcohol abuse with prior episodes of alcohol withdrawal, nicotine abuse, questionable history of liver cirrhosis who presents with 8 days history of nausea vomiting and abdominal pain..  Patient complains of epigastric pain radiating to the back.   superior mesenteric artery, bilateral renal arteries, and inferior mesenteric artery are patent. Chest: The lungs are free of focal consolidations. No suspicious pulmonary nodules are visible. There is no pleural effusion or pneumothorax. The heart size is normal. There is no pericardial effusion. There is no supraclavicular, axillary, mediastinal or hilar lymphadenopathy. There are a few chronic left-sided rib fractures. Abdomen and Pelvis: Evaluation of the solid abdominal organs is limited by the arterial phase of contrast. There is hepatic steatosis. No focal intrahepatic lesions are seen. The gallbladder is normal without evidence of gallstones or gallbladder wall thickening. There is no intrahepatic or extrahepatic biliary ductal dilatation. The spleen, pancreas, and adrenal glands appear normal. No stones are identified in either kidney or along the course of either ureter and no hydronephrosis or hydroureter is seen. Prostate and urinary bladder are unremarkable. The stomach is normal.  The small bowel and large bowel are normal in course and caliber. There is no evidence of bowel wall thickening or bowel obstruction.  No free intraperitoneal air is seen. No lymphadenopathy is seen. Bone windows demonstrate no suspicious lytic or blastic lesions.     1.  No evidence of acute aortic dissection, aneurysm, or intramural hematoma. 2.  No abnormality in the chest abdomen and pelvis. 3.  Hepatic steatosis.     XR KNEE LEFT (3 VIEWS)    Result Date: 3/11/2024  Exam: Left knee, 3 views History: Pain at left knee Comparison: None available Findings: There is no acute fracture or dislocation. No joint effusion is present. There is mild medial compartment joint space narrowing. There is no soft tissue swelling.     No acute osseous injury.     XR CHEST (2 VW)    Result Date: 3/11/2024  EXAM: Chest PA and Lateral views INDICATION: Chest pain COMPARISON: 9/6/23 FINDINGS: There is no focal consolidation, pleural effusion,

## 2024-03-12 NOTE — DISCHARGE INSTRUCTIONS
Alcohol and Substance Abuse Community Resources:    Information and Referrals:  [facility] ([facility]) 24 hours/ 7days - [phone]  [facility] [place] (24/7 hotline)- 852.306.9930([place]); 882.614.5063 ([place])  www.addictionservicescouncil.org;  [address], [place] [address]  WANDA Ortiz II (Treatment consultant) - [phone]   (www.How do you roll?) or email: [email or web address]  WANDA Bourne () - [phone]    Self Help Resources:  Alcoholics Anonymous Hotline (www.Storm Player)  (24 hours/ 7days) - 394.561.3887    [address] [facility] [address](enter on [facility]) [place], [place] [address]  [facility] [place] (www.[facility].org)- for [place] - 6-460-256-3986  Al-ANON . [place]/ [place] AL-ANON Information Services - 9-502-088-2349  (www.SRCH2.org)   Narcotics Anonymous (www.ishBowl)  - 330.790.6957  [facility] (www.smartrecovery.org) - 691.595.7620    Suicide Hotlines: toll free and available 24/7  404-132-CARE (2273)  9-178-SNVMPPM (1-962.270.1571)  1-415-689-TALK (4-597-087-TALK) - Veterans Crisis Line  TTY: 8-347-933-4TTY    Detoxification Services/ Inpatient Treatment/ Residential Treatment Programs:  [facility] - [phone] [address], [place] [address]  [facility] ([facility]) - [phone]  [address], [place] [address]  [facility] - [phone]  [address], [place] [address]  [facility] -[phone]  [address] [place], [place] [address] [place] [address] ([facility]) - [phone] ext. [phone]  [address] [place] [address] [facility] - 4-766-564-7681 or 7-460-094-CARE  [address] [place] [address] [address] [place] [address] [facility] [place] (www.[facility]) - [phone]  [address]  [facility] ([facility]) - [phone]  [address] [place], [place] [address]  [facility] ([facility]) - [phone]  [address], [place] [address]  [facility] - [phone]   [address] [facility] [address] [place], [place] [address]  [employer] (www.Factabase)  - [phone]  [address] [facility] [address] [place], [place] [address]  [facility] - [phone]  [address], [place] [address]  [facility] - [phone]  [address] [facility] [address] [place], [place] [address]  [facility] - [phone]   [address], [place] [address]  [facility] - [phone]   [address], [place] [address]  [facility] - [phone]   [address] [facility] [address] [place], [place] [address]  [place] [facility] - [phone]   [address], [place] [address]  [facility] Substance Abuse - [phone]  [address] [place], [place] [address]  [facility] - [phone]  [address] [address]    Methadone/ Suboxone Clinics  [facility] - [phone]   [address] [place], [place] [address]  [facility] - [phone]   [address] [place], [place] [address]  [facility] - [phone]   [address], [place] [address] (use lower level entrance)  [facility] - [phone]   [address], [place] [address]  [facility] - [phone]   [address] [place], [place] [address]  [employer] (www.Factabase)  - [phone]  [address], [place] [address] [place] [address] - [phone]   [address] [place] [address]

## 2024-03-12 NOTE — PLAN OF CARE
Problem: Discharge Planning  Goal: Discharge to home or other facility with appropriate resources  Outcome: Progressing  Flowsheets  Taken 3/11/2024 2351  Discharge to home or other facility with appropriate resources: Identify barriers to discharge with patient and caregiver  Taken 3/11/2024 2350  Discharge to home or other facility with appropriate resources: Identify barriers to discharge with patient and caregiver     Problem: Pain  Goal: Verbalizes/displays adequate comfort level or baseline comfort level  Outcome: Progressing     Problem: Safety - Adult  Goal: Free from fall injury  Outcome: Progressing

## 2024-03-12 NOTE — CARE COORDINATION
11:56 AM  SW consult noted. Consideration for rehab. Teresita Beltran from [facility] following. No needs from CM anticipated     Electronically signed by Lico Kaplan RN, CM on 3/12/2024 at 11:57 AM.  Phone: [phone]  Fax: [phone]

## 2024-03-13 ENCOUNTER — APPOINTMENT (OUTPATIENT)
Dept: ULTRASOUND IMAGING | Age: 39
End: 2024-03-13
Payer: MEDICAID

## 2024-03-13 LAB
ALBUMIN SERPL-MCNC: 3.9 G/DL (ref 3.4–5)
ALBUMIN/GLOB SERPL: 1 {RATIO} (ref 1.1–2.2)
ALP SERPL-CCNC: 137 U/L (ref 40–129)
ALT SERPL-CCNC: 111 U/L (ref 10–40)
ANION GAP SERPL CALCULATED.3IONS-SCNC: 14 MMOL/L (ref 3–16)
AST SERPL-CCNC: 305 U/L (ref 15–37)
BILIRUB SERPL-MCNC: 1.6 MG/DL (ref 0–1)
BUN SERPL-MCNC: 6 MG/DL (ref 7–20)
CALCIUM SERPL-MCNC: 8.8 MG/DL (ref 8.3–10.6)
CHLORIDE SERPL-SCNC: 100 MMOL/L (ref 99–110)
CO2 SERPL-SCNC: 20 MMOL/L (ref 21–32)
CREAT SERPL-MCNC: 0.6 MG/DL (ref 0.9–1.3)
DEPRECATED RDW RBC AUTO: 15 % (ref 12.4–15.4)
EKG ATRIAL RATE: 95 BPM
EKG DIAGNOSIS: NORMAL
EKG P AXIS: 54 DEGREES
EKG P-R INTERVAL: 174 MS
EKG Q-T INTERVAL: 430 MS
EKG QRS DURATION: 84 MS
EKG QTC CALCULATION (BAZETT): 540 MS
EKG R AXIS: -19 DEGREES
EKG T AXIS: 53 DEGREES
EKG VENTRICULAR RATE: 95 BPM
GFR SERPLBLD CREATININE-BSD FMLA CKD-EPI: >60 ML/MIN/{1.73_M2}
GLUCOSE BLD-MCNC: 162 MG/DL (ref 70–99)
GLUCOSE SERPL-MCNC: 113 MG/DL (ref 70–99)
HCT VFR BLD AUTO: 36.4 % (ref 40.5–52.5)
HGB BLD-MCNC: 12.1 G/DL (ref 13.5–17.5)
LACTATE BLDV-SCNC: 0.9 MMOL/L (ref 0.4–2)
MAGNESIUM SERPL-MCNC: 1.9 MG/DL (ref 1.8–2.4)
MCH RBC QN AUTO: 37.9 PG (ref 26–34)
MCHC RBC AUTO-ENTMCNC: 33.3 G/DL (ref 31–36)
MCV RBC AUTO: 114 FL (ref 80–100)
PERFORMED ON: ABNORMAL
PLATELET # BLD AUTO: 31 K/UL (ref 135–450)
PMV BLD AUTO: 9.3 FL (ref 5–10.5)
POTASSIUM SERPL-SCNC: 3.2 MMOL/L (ref 3.5–5.1)
PROT SERPL-MCNC: 7.7 G/DL (ref 6.4–8.2)
RBC # BLD AUTO: 3.19 M/UL (ref 4.2–5.9)
SODIUM SERPL-SCNC: 134 MMOL/L (ref 136–145)
WBC # BLD AUTO: 7.1 K/UL (ref 4–11)

## 2024-03-13 PROCEDURE — 93010 ELECTROCARDIOGRAM REPORT: CPT | Performed by: INTERNAL MEDICINE

## 2024-03-13 PROCEDURE — 6360000002 HC RX W HCPCS: Performed by: INTERNAL MEDICINE

## 2024-03-13 PROCEDURE — A4216 STERILE WATER/SALINE, 10 ML: HCPCS | Performed by: INTERNAL MEDICINE

## 2024-03-13 PROCEDURE — 80053 COMPREHEN METABOLIC PANEL: CPT

## 2024-03-13 PROCEDURE — 85027 COMPLETE CBC AUTOMATED: CPT

## 2024-03-13 PROCEDURE — 36415 COLL VENOUS BLD VENIPUNCTURE: CPT

## 2024-03-13 PROCEDURE — C9113 INJ PANTOPRAZOLE SODIUM, VIA: HCPCS | Performed by: INTERNAL MEDICINE

## 2024-03-13 PROCEDURE — 76705 ECHO EXAM OF ABDOMEN: CPT

## 2024-03-13 PROCEDURE — 2500000003 HC RX 250 WO HCPCS: Performed by: INTERNAL MEDICINE

## 2024-03-13 PROCEDURE — 2060000000 HC ICU INTERMEDIATE R&B

## 2024-03-13 PROCEDURE — 6370000000 HC RX 637 (ALT 250 FOR IP): Performed by: STUDENT IN AN ORGANIZED HEALTH CARE EDUCATION/TRAINING PROGRAM

## 2024-03-13 PROCEDURE — 83605 ASSAY OF LACTIC ACID: CPT

## 2024-03-13 PROCEDURE — 2580000003 HC RX 258: Performed by: INTERNAL MEDICINE

## 2024-03-13 PROCEDURE — 83735 ASSAY OF MAGNESIUM: CPT

## 2024-03-13 PROCEDURE — 6360000002 HC RX W HCPCS: Performed by: STUDENT IN AN ORGANIZED HEALTH CARE EDUCATION/TRAINING PROGRAM

## 2024-03-13 PROCEDURE — 2580000003 HC RX 258: Performed by: STUDENT IN AN ORGANIZED HEALTH CARE EDUCATION/TRAINING PROGRAM

## 2024-03-13 PROCEDURE — 93005 ELECTROCARDIOGRAM TRACING: CPT | Performed by: STUDENT IN AN ORGANIZED HEALTH CARE EDUCATION/TRAINING PROGRAM

## 2024-03-13 PROCEDURE — 6370000000 HC RX 637 (ALT 250 FOR IP): Performed by: INTERNAL MEDICINE

## 2024-03-13 RX ORDER — POTASSIUM CHLORIDE 20 MEQ/1
40 TABLET, EXTENDED RELEASE ORAL ONCE
Status: COMPLETED | OUTPATIENT
Start: 2024-03-13 | End: 2024-03-13

## 2024-03-13 RX ORDER — LANOLIN ALCOHOL/MO/W.PET/CERES
400 CREAM (GRAM) TOPICAL DAILY
Status: DISCONTINUED | OUTPATIENT
Start: 2024-03-13 | End: 2024-03-15 | Stop reason: HOSPADM

## 2024-03-13 RX ADMIN — LORAZEPAM 2 MG: 2 INJECTION INTRAMUSCULAR; INTRAVENOUS at 01:06

## 2024-03-13 RX ADMIN — SODIUM CHLORIDE, PRESERVATIVE FREE 10 ML: 5 INJECTION INTRAVENOUS at 21:58

## 2024-03-13 RX ADMIN — SODIUM CHLORIDE, PRESERVATIVE FREE 40 MG: 5 INJECTION INTRAVENOUS at 08:52

## 2024-03-13 RX ADMIN — SODIUM CHLORIDE, PRESERVATIVE FREE 40 MG: 5 INJECTION INTRAVENOUS at 21:58

## 2024-03-13 RX ADMIN — SODIUM CHLORIDE, POTASSIUM CHLORIDE, SODIUM LACTATE AND CALCIUM CHLORIDE: 600; 310; 30; 20 INJECTION, SOLUTION INTRAVENOUS at 03:38

## 2024-03-13 RX ADMIN — PHENOBARBITAL SODIUM 65 MG: 65 INJECTION INTRAMUSCULAR; INTRAVENOUS at 21:58

## 2024-03-13 RX ADMIN — LORAZEPAM 2 MG: 2 INJECTION INTRAMUSCULAR; INTRAVENOUS at 20:20

## 2024-03-13 RX ADMIN — FOLIC ACID: 5 INJECTION, SOLUTION INTRAMUSCULAR; INTRAVENOUS; SUBCUTANEOUS at 08:59

## 2024-03-13 RX ADMIN — PHENOBARBITAL SODIUM 65 MG: 65 INJECTION INTRAMUSCULAR; INTRAVENOUS at 08:52

## 2024-03-13 RX ADMIN — POTASSIUM CHLORIDE 40 MEQ: 1500 TABLET, EXTENDED RELEASE ORAL at 19:04

## 2024-03-13 RX ADMIN — Medication 400 MG: at 19:04

## 2024-03-13 RX ADMIN — LORAZEPAM 2 MG: 2 INJECTION INTRAMUSCULAR; INTRAVENOUS at 03:37

## 2024-03-13 RX ADMIN — SODIUM CHLORIDE, POTASSIUM CHLORIDE, SODIUM LACTATE AND CALCIUM CHLORIDE: 600; 310; 30; 20 INJECTION, SOLUTION INTRAVENOUS at 23:55

## 2024-03-13 RX ADMIN — LORAZEPAM 2 MG: 2 INJECTION INTRAMUSCULAR; INTRAVENOUS at 23:55

## 2024-03-13 NOTE — PLAN OF CARE
Problem: Discharge Planning  Goal: Discharge to home or other facility with appropriate resources  3/13/2024 0019 by Hien Gonzalez, RN  Outcome: Progressing  Flowsheets (Taken 3/13/2024 0019)  Discharge to home or other facility with appropriate resources:   Identify barriers to discharge with patient and caregiver   Arrange for needed discharge resources and transportation as appropriate   Identify discharge learning needs (meds, wound care, etc)  Note: Pt is from home, coming in with chest pain, abdominal pain and flank pain.  Patient is in CIWA precautions. Patient plans to return home or rehab after leaving the hospital      Problem: Pain  Goal: Verbalizes/displays adequate comfort level or baseline comfort level  3/13/2024 0019 by Hien Gonzalez, RN  Flowsheets (Taken 3/13/2024 0019)  Verbalizes/displays adequate comfort level or baseline comfort level:   Encourage patient to monitor pain and request assistance   Assess pain using appropriate pain scale   Administer analgesics based on type and severity of pain and evaluate response  Note: Pt did not report pain throughout the night.      Problem: Safety - Adult  Goal: Free from fall injury  3/13/2024 0019 by Hien Gonzalez, RN  Flowsheets (Taken 3/13/2024 0019)  Free From Fall Injury: Instruct family/caregiver on patient safety  Note: Pt is a Fall Risk. See Galvan Fall Risk Score. Pt bed in low position and side rails up. Call light and belongings in reach. Pt encouraged to call for assistance. Will continue with hourly rounds for PO intake, pain needs, toileting, and repositioning as needed.

## 2024-03-13 NOTE — PROGRESS NOTES
Hospital Medicine Progress Note      Date of Admission: 3/11/2024  Hospital Day: 3    Chief Admission Complaint: Abdominal pain     Subjective: Patient was seen and evaluated at bedside.  Patient is drowsy but arousable.  Still on a liquid diet but has poor intake.      Presenting Admission History:       28-year-old -American gentleman with a past medical history of alcohol abuse, alcohol withdrawal seizure and nicotine dependence.  He presented to the hospital for evaluation of abdominal pain ongoing for about 1 day prior to presentation.  Also reported poor oral intake for about 3 days.  In the ER patient was tachycardic up to the 130s.  Was otherwise hemodynamically stable.  Labs showed hyperkalemia with a potassium of 5.8, anion gap acidosis of 39.  Transaminitis with hyperbilirubinemia.  Macrocytosis. CTA of the abdomen pelvis was negative for dissection, aneurysm or intramural hematoma.  Showed fatty liver.  Patient was admitted for alcohol withdrawal.    Assessment/Plan:      Current Principal Problem:  Alcohol withdrawal delirium (HCC)    Alcohol abuse/alcohol withdrawal/nicotine dependence/encephalopathy  Transaminitis/fatty liver  Anion gap metabolic acidosis  Electrolyte imbalance/prolonged QTc    Plan:  *Reports consuming 1 L of alcohol per day, sometimes more depending on how much money he has  Imaging concerning for fatty liver  Started on phenobarbital therapy on admission  CIWA protocol  Continue thiamine supplementation  Continues to be encephalopathic with drowsy but arousable  Has thrombocytopenia likely secondary to liver disease  Says he wants to quit drinking  Social work consult, considering rehab    *Transaminitis improving  US abdomen negative for cholelithiasis or obstruction  Lipase 55  On clear liquid diet, reports nausea  Avoid QT prolonging drugs due to prolonged QTc    *Presented with anion gap metabolic acidosis with lactic acidosis and alcoholic ketoacidosis  Continue IV  fluid resuscitation  Check lactic acid in the morning    *Presented with hyperkalemia which has resolved with treatment on admission  Target potassium greater than 4, magnesium greater than 2  Also has evidence of prolonged QTc on EKG  Recheck EKG in a.m.  Avoid QT prolonging drugs    *CODE STATUS, full code  Verified with the patient at bedside  POA is patient's uncle Errol Gonzales    *Will need meds to beds with discharge    Physical Exam Performed:      General: No distress, alert and oriented x3, drowsy but arousable  Cardiac: Tachycardic, S1 plus S2 regular rate and rhythm, no murmurs rubs or gallops  Respiratory: No wheeze or crackles appreciated, equal air entry bilaterally  GI/: Abdomen soft, tender epigastrium and right upper quadrant to deep palpation, bowel sounds audible  Skin: No rashes or ulcers present  MSK: Peripheral pulses intact    /89   Pulse 82   Temp 98.7 °F (37.1 °C) (Oral)   Resp 16   Ht 1.88 m (6' 2\")   Wt 65.3 kg (143 lb 15.4 oz)   SpO2 100%   BMI 18.48 kg/m²     Diet: ADULT DIET; Clear Liquid  DVT Prophylaxis: []PPx LMWH  []SQ Heparin  [x]IPC/SCDs  []Eliquis  []Xarelto  []Coumadin  []Other -      Code status: Prior  PT/OT Eval Status:   [x]NOT yet ordered  []Ordered and Pending   []Seen with Recommendations for:  []Home independently  []Home w/ assist  []HHC  []SNF  []Acute Rehab    Anticipated Discharge Day/Date: 3 days    Anticipated Discharge Location: []Home  []HHC  []SNF  []Acute Rehab  []ECF  []LTAC  []Hospice  [x]Other -pending clinical course    Consults:      IP CONSULT TO SOCIAL WORK          Medications:  Personally reviewed in detail in conjunction w/ labs as documented for evidence of drug toxicity.     Infusion Medications    lactated ringers IV soln 100 mL/hr at 03/13/24 0338    dextrose      sodium chloride       Scheduled Medications    sodium chloride flush  5-40 mL IntraVENous 2 times per day    PHENobarbital  65 mg IntraVENous BID    nicotine  1 patch

## 2024-03-14 LAB
ALBUMIN SERPL-MCNC: 3.5 G/DL (ref 3.4–5)
ALBUMIN/GLOB SERPL: 1 {RATIO} (ref 1.1–2.2)
ALP SERPL-CCNC: 122 U/L (ref 40–129)
ALT SERPL-CCNC: 96 U/L (ref 10–40)
ANION GAP SERPL CALCULATED.3IONS-SCNC: 10 MMOL/L (ref 3–16)
AST SERPL-CCNC: 241 U/L (ref 15–37)
BILIRUB SERPL-MCNC: 1.7 MG/DL (ref 0–1)
BUN SERPL-MCNC: 3 MG/DL (ref 7–20)
CALCIUM SERPL-MCNC: 8.5 MG/DL (ref 8.3–10.6)
CHLORIDE SERPL-SCNC: 99 MMOL/L (ref 99–110)
CO2 SERPL-SCNC: 25 MMOL/L (ref 21–32)
CREAT SERPL-MCNC: <0.5 MG/DL (ref 0.9–1.3)
GFR SERPLBLD CREATININE-BSD FMLA CKD-EPI: >60 ML/MIN/{1.73_M2}
GLUCOSE SERPL-MCNC: 100 MG/DL (ref 70–99)
LACTATE BLDV-SCNC: 1.3 MMOL/L (ref 0.4–2)
MAGNESIUM SERPL-MCNC: 1.4 MG/DL (ref 1.8–2.4)
POTASSIUM SERPL-SCNC: 3.1 MMOL/L (ref 3.5–5.1)
PROT SERPL-MCNC: 7.1 G/DL (ref 6.4–8.2)
SODIUM SERPL-SCNC: 134 MMOL/L (ref 136–145)

## 2024-03-14 PROCEDURE — 6360000002 HC RX W HCPCS: Performed by: STUDENT IN AN ORGANIZED HEALTH CARE EDUCATION/TRAINING PROGRAM

## 2024-03-14 PROCEDURE — 6360000002 HC RX W HCPCS: Performed by: INTERNAL MEDICINE

## 2024-03-14 PROCEDURE — 2060000000 HC ICU INTERMEDIATE R&B

## 2024-03-14 PROCEDURE — 83605 ASSAY OF LACTIC ACID: CPT

## 2024-03-14 PROCEDURE — A4216 STERILE WATER/SALINE, 10 ML: HCPCS | Performed by: INTERNAL MEDICINE

## 2024-03-14 PROCEDURE — 83735 ASSAY OF MAGNESIUM: CPT

## 2024-03-14 PROCEDURE — C9113 INJ PANTOPRAZOLE SODIUM, VIA: HCPCS | Performed by: INTERNAL MEDICINE

## 2024-03-14 PROCEDURE — 2580000003 HC RX 258: Performed by: INTERNAL MEDICINE

## 2024-03-14 PROCEDURE — 80053 COMPREHEN METABOLIC PANEL: CPT

## 2024-03-14 PROCEDURE — 6370000000 HC RX 637 (ALT 250 FOR IP): Performed by: STUDENT IN AN ORGANIZED HEALTH CARE EDUCATION/TRAINING PROGRAM

## 2024-03-14 PROCEDURE — 2500000003 HC RX 250 WO HCPCS: Performed by: INTERNAL MEDICINE

## 2024-03-14 PROCEDURE — 36415 COLL VENOUS BLD VENIPUNCTURE: CPT

## 2024-03-14 PROCEDURE — 6370000000 HC RX 637 (ALT 250 FOR IP): Performed by: INTERNAL MEDICINE

## 2024-03-14 RX ORDER — MAGNESIUM SULFATE IN WATER 40 MG/ML
2000 INJECTION, SOLUTION INTRAVENOUS ONCE
Status: COMPLETED | OUTPATIENT
Start: 2024-03-14 | End: 2024-03-14

## 2024-03-14 RX ADMIN — FOLIC ACID: 5 INJECTION, SOLUTION INTRAMUSCULAR; INTRAVENOUS; SUBCUTANEOUS at 09:04

## 2024-03-14 RX ADMIN — POTASSIUM BICARBONATE 40 MEQ: 782 TABLET, EFFERVESCENT ORAL at 14:30

## 2024-03-14 RX ADMIN — SODIUM CHLORIDE, PRESERVATIVE FREE 40 MG: 5 INJECTION INTRAVENOUS at 21:29

## 2024-03-14 RX ADMIN — SODIUM CHLORIDE, PRESERVATIVE FREE 10 ML: 5 INJECTION INTRAVENOUS at 21:30

## 2024-03-14 RX ADMIN — Medication 400 MG: at 09:05

## 2024-03-14 RX ADMIN — POTASSIUM BICARBONATE 40 MEQ: 782 TABLET, EFFERVESCENT ORAL at 21:29

## 2024-03-14 RX ADMIN — SODIUM CHLORIDE, PRESERVATIVE FREE 40 MG: 5 INJECTION INTRAVENOUS at 09:05

## 2024-03-14 RX ADMIN — MAGNESIUM SULFATE HEPTAHYDRATE 2000 MG: 40 INJECTION, SOLUTION INTRAVENOUS at 14:35

## 2024-03-14 NOTE — PLAN OF CARE
Problem: Discharge Planning  Goal: Discharge to home or other facility with appropriate resources  Outcome: Progressing  Flowsheets (Taken 3/14/2024 0250)  Discharge to home or other facility with appropriate resources:   Identify barriers to discharge with patient and caregiver   Identify discharge learning needs (meds, wound care, etc)  Note: Patient is planning to discharge back home once he leaves.  Patient is currently a CIWA patient.      Problem: Pain  Goal: Verbalizes/displays adequate comfort level or baseline comfort level  Outcome: Progressing  Flowsheets (Taken 3/14/2024 0250)  Verbalizes/displays adequate comfort level or baseline comfort level:   Encourage patient to monitor pain and request assistance   Administer analgesics based on type and severity of pain and evaluate response  Note: Patient did not report any pain overnight     Problem: Safety - Adult  Goal: Free from fall injury  Outcome: Progressing  Flowsheets (Taken 3/14/2024 0250)  Free From Fall Injury: Instruct family/caregiver on patient safety  Note: Pt is a Fall Risk. See Galvan Fall Risk Score. Pt bed in low position and side rails up. Call light and belongings in reach. Pt encouraged to call for assistance. Will continue with hourly rounds for PO intake, pain needs, toileting, and repositioning as needed.

## 2024-03-14 NOTE — CARE COORDINATION
Chart review day 3- pt from home, Teresita Beltran has met with pt and pt has agreed to outpt alcohol rehab at [facility]. Cm will continue to follow for DCP needs.     CM spoke with Len in [employer]. She has not been able to reach pt by phone. She spoke with pt's uncle but he was not aware of pt finances and if pt had insurance.

## 2024-03-14 NOTE — PROGRESS NOTES
resolved    *Presented with hyperkalemia which has resolved with treatment on admission  Target potassium greater than 4, magnesium greater than 2  Also has evidence of prolonged QTc on EKG  Avoid QT prolonging drugs    *CODE STATUS, full code  Verified with the patient at bedside  POA is patient's uncle Errol Gonzales    *Will need meds to beds with discharge    Physical Exam Performed:      General: No distress, alert and oriented x3, drowsy but arousable  Cardiac: S1 plus S2 regular rate and rhythm, no murmurs rubs or gallops  Respiratory: No wheeze or crackles appreciated, equal air entry bilaterally  GI/: Abdomen soft, tender epigastrium and right upper quadrant to deep palpation, bowel sounds audible  Skin: No rashes or ulcers present  MSK: Peripheral pulses intact    /86   Pulse 75   Temp 98.7 °F (37.1 °C) (Oral)   Resp 15   Ht 1.88 m (6' 2\")   Wt 65.5 kg (144 lb 6.4 oz)   SpO2 100%   BMI 18.54 kg/m²     Diet: ADULT DIET; Dysphagia - Soft and Bite Sized  DVT Prophylaxis: []PPx LMWH  []SQ Heparin  [x]IPC/SCDs  []Eliquis  []Xarelto  []Coumadin  []Other -      Code status: Prior  PT/OT Eval Status:   [x]NOT yet ordered  []Ordered and Pending   []Seen with Recommendations for:  []Home independently  []Home w/ assist  []HHC  []SNF  []Acute Rehab    Anticipated Discharge Day/Date: 3 days    Anticipated Discharge Location: []Home  []HHC  []SNF  []Acute Rehab  []ECF  []LTAC  []Hospice  [x]Other -pending clinical course    Consults:      IP CONSULT TO SOCIAL WORK          Medications:  Personally reviewed in detail in conjunction w/ labs as documented for evidence of drug toxicity.     Infusion Medications    dextrose      sodium chloride       Scheduled Medications    potassium bicarb-citric acid  40 mEq Oral BID    magnesium sulfate  2,000 mg IntraVENous Once    magnesium oxide  400 mg Oral Daily    sodium chloride flush  5-40 mL IntraVENous 2 times per day    nicotine  1 patch TransDERmal Daily     exam and assessment/plan, from prior was copied and carried forward from encounter on 3/13/2024.  New findings and changes have been reviewed and updated appropriately to reflect today's encounter.

## 2024-03-14 NOTE — PLAN OF CARE
Problem: Discharge Planning  Goal: Discharge to home or other facility with appropriate resources  3/14/2024 1348 by Sundar Burton RN  Outcome: Progressing  Note: Patient working with social work to get rehab placement following discharge.   3/14/2024 0250 by Hien Gonzalez RN  Outcome: Progressing  Flowsheets (Taken 3/14/2024 0250)  Discharge to home or other facility with appropriate resources:   Identify barriers to discharge with patient and caregiver   Identify discharge learning needs (meds, wound care, etc)  Note: Patient is planning to discharge back home once he leaves.  Patient is currently a CIWA patient.      Problem: Pain  Goal: Verbalizes/displays adequate comfort level or baseline comfort level  3/14/2024 0250 by Hien Gonzalez RN  Outcome: Progressing  Flowsheets (Taken 3/14/2024 0250)  Verbalizes/displays adequate comfort level or baseline comfort level:   Encourage patient to monitor pain and request assistance   Administer analgesics based on type and severity of pain and evaluate response  Note: Patient did not report any pain overnight     Problem: Safety - Adult  Goal: Free from fall injury  3/14/2024 0250 by Hien Gonzalez RN  Outcome: Progressing  Flowsheets (Taken 3/14/2024 0250)  Free From Fall Injury: Instruct family/caregiver on patient safety  Note: Pt is a Fall Risk. See Galvan Fall Risk Score. Pt bed in low position and side rails up. Call light and belongings in reach. Pt encouraged to call for assistance. Will continue with hourly rounds for PO intake, pain needs, toileting, and repositioning as needed.

## 2024-03-15 VITALS
OXYGEN SATURATION: 100 % | BODY MASS INDEX: 19.1 KG/M2 | HEART RATE: 97 BPM | HEIGHT: 74 IN | DIASTOLIC BLOOD PRESSURE: 88 MMHG | TEMPERATURE: 98.3 F | SYSTOLIC BLOOD PRESSURE: 132 MMHG | RESPIRATION RATE: 16 BRPM | WEIGHT: 148.81 LBS

## 2024-03-15 PROBLEM — F10.931 ALCOHOL WITHDRAWAL DELIRIUM (HCC): Status: RESOLVED | Noted: 2024-03-11 | Resolved: 2024-03-15

## 2024-03-15 LAB
ALBUMIN SERPL-MCNC: 3.6 G/DL (ref 3.4–5)
ALBUMIN/GLOB SERPL: 1.1 {RATIO} (ref 1.1–2.2)
ALP SERPL-CCNC: 150 U/L (ref 40–129)
ALT SERPL-CCNC: 118 U/L (ref 10–40)
ANION GAP SERPL CALCULATED.3IONS-SCNC: 8 MMOL/L (ref 3–16)
AST SERPL-CCNC: 326 U/L (ref 15–37)
BACTERIA BLD CULT ORG #2: NORMAL
BACTERIA BLD CULT: NORMAL
BILIRUB SERPL-MCNC: 1.4 MG/DL (ref 0–1)
BUN SERPL-MCNC: 5 MG/DL (ref 7–20)
CALCIUM SERPL-MCNC: 8.4 MG/DL (ref 8.3–10.6)
CHLORIDE SERPL-SCNC: 98 MMOL/L (ref 99–110)
CO2 SERPL-SCNC: 27 MMOL/L (ref 21–32)
CREAT SERPL-MCNC: <0.5 MG/DL (ref 0.9–1.3)
GFR SERPLBLD CREATININE-BSD FMLA CKD-EPI: >60 ML/MIN/{1.73_M2}
GLUCOSE SERPL-MCNC: 125 MG/DL (ref 70–99)
POTASSIUM SERPL-SCNC: 3.8 MMOL/L (ref 3.5–5.1)
PROT SERPL-MCNC: 7 G/DL (ref 6.4–8.2)
SODIUM SERPL-SCNC: 133 MMOL/L (ref 136–145)

## 2024-03-15 PROCEDURE — 6370000000 HC RX 637 (ALT 250 FOR IP): Performed by: INTERNAL MEDICINE

## 2024-03-15 PROCEDURE — 2580000003 HC RX 258: Performed by: INTERNAL MEDICINE

## 2024-03-15 PROCEDURE — C9113 INJ PANTOPRAZOLE SODIUM, VIA: HCPCS | Performed by: INTERNAL MEDICINE

## 2024-03-15 PROCEDURE — 6360000002 HC RX W HCPCS: Performed by: INTERNAL MEDICINE

## 2024-03-15 PROCEDURE — 80053 COMPREHEN METABOLIC PANEL: CPT

## 2024-03-15 PROCEDURE — A4216 STERILE WATER/SALINE, 10 ML: HCPCS | Performed by: INTERNAL MEDICINE

## 2024-03-15 PROCEDURE — 6370000000 HC RX 637 (ALT 250 FOR IP): Performed by: STUDENT IN AN ORGANIZED HEALTH CARE EDUCATION/TRAINING PROGRAM

## 2024-03-15 PROCEDURE — 36415 COLL VENOUS BLD VENIPUNCTURE: CPT

## 2024-03-15 RX ORDER — THIAMINE MONONITRATE (VIT B1) 100 MG
100 TABLET ORAL DAILY
Qty: 30 TABLET | Refills: 3 | Status: SHIPPED | OUTPATIENT
Start: 2024-03-15

## 2024-03-15 RX ORDER — LANOLIN ALCOHOL/MO/W.PET/CERES
400 CREAM (GRAM) TOPICAL DAILY
Qty: 30 TABLET | Refills: 0 | Status: SHIPPED | OUTPATIENT
Start: 2024-03-16 | End: 2024-04-15

## 2024-03-15 RX ADMIN — SODIUM CHLORIDE, PRESERVATIVE FREE 10 ML: 5 INJECTION INTRAVENOUS at 08:12

## 2024-03-15 RX ADMIN — SODIUM CHLORIDE, PRESERVATIVE FREE 40 MG: 5 INJECTION INTRAVENOUS at 08:11

## 2024-03-15 RX ADMIN — POTASSIUM BICARBONATE 40 MEQ: 782 TABLET, EFFERVESCENT ORAL at 08:11

## 2024-03-15 RX ADMIN — Medication 400 MG: at 08:11

## 2024-03-15 NOTE — PROGRESS NOTES
Pt discharged via wheelchair with Lyft ride home.  Pt has no complaints at this time.  IV and telemetry removed.  Pt is stable.  Discharge instructions given and pharmacy meds picked up

## 2024-03-15 NOTE — PROGRESS NOTES
2130  Shift assessment complete. Medications administered at this time. No s/s of distress. CIWA reviewed with a measurement of 0. Pt expresses no further needs at this time. Call light in reach.           2355 Pt had asked earlier for belongings to look for a family member number. His belongings have live lice on them. They were placed in double bags. Pt asked another nurse to look in his belongings while writer was with another pt. He opened bags and had clothes spread across the bed. All belongings were re double bagged and pt was cleaned up, and all linens changed. It was found that pt was hiding his cigarettes. They were place in closet. He was explained the rules of the hospital, shown he had a nicotine patch on, and pt was shown he was on camera and bed alarm. Pt states understanding and wants to be discharged in the morning.    0713 Shift change, bedside report given to  Sumeet COTA. Pt exhibits no s/s of distress. Call light in reach. Care has been transferred at this time.

## 2024-03-15 NOTE — DISCHARGE SUMMARY
BLOODCULT2  03/11/2024 09:00 PM     No Growth to date.  Any change in status will be called.     Organism: No results found for: \"ORG\"    Time Spent Discharging patient >35 minutes    Electronically signed by Jose D Braden MD on 3/15/2024 at 9:00 AM